# Patient Record
Sex: FEMALE | Race: OTHER | HISPANIC OR LATINO | URBAN - METROPOLITAN AREA
[De-identification: names, ages, dates, MRNs, and addresses within clinical notes are randomized per-mention and may not be internally consistent; named-entity substitution may affect disease eponyms.]

---

## 2023-08-16 ENCOUNTER — EMERGENCY (EMERGENCY)
Facility: HOSPITAL | Age: 53
LOS: 1 days | Discharge: HOME | End: 2023-08-16
Attending: EMERGENCY MEDICINE | Admitting: EMERGENCY MEDICINE
Payer: MEDICAID

## 2023-08-16 VITALS
TEMPERATURE: 98 F | OXYGEN SATURATION: 98 % | HEART RATE: 71 BPM | SYSTOLIC BLOOD PRESSURE: 177 MMHG | WEIGHT: 164.91 LBS | DIASTOLIC BLOOD PRESSURE: 91 MMHG | HEIGHT: 67 IN | RESPIRATION RATE: 18 BRPM

## 2023-08-16 DIAGNOSIS — F41.0 PANIC DISORDER [EPISODIC PAROXYSMAL ANXIETY]: ICD-10-CM

## 2023-08-16 DIAGNOSIS — R51.9 HEADACHE, UNSPECIFIED: ICD-10-CM

## 2023-08-16 DIAGNOSIS — I10 ESSENTIAL (PRIMARY) HYPERTENSION: ICD-10-CM

## 2023-08-16 DIAGNOSIS — T46.5X6A UNDERDOSING OF OTHER ANTIHYPERTENSIVE DRUGS, INITIAL ENCOUNTER: ICD-10-CM

## 2023-08-16 DIAGNOSIS — Z59.01 SHELTERED HOMELESSNESS: ICD-10-CM

## 2023-08-16 PROCEDURE — 99283 EMERGENCY DEPT VISIT LOW MDM: CPT

## 2023-08-16 PROCEDURE — 99284 EMERGENCY DEPT VISIT MOD MDM: CPT

## 2023-08-16 RX ORDER — ACETAMINOPHEN 500 MG
650 TABLET ORAL ONCE
Refills: 0 | Status: COMPLETED | OUTPATIENT
Start: 2023-08-16 | End: 2023-08-16

## 2023-08-16 RX ADMIN — Medication 650 MILLIGRAM(S): at 08:35

## 2023-08-16 SDOH — ECONOMIC STABILITY - HOUSING INSECURITY: SHELTERED HOMELESSNESS: Z59.01

## 2023-08-16 NOTE — ED PROVIDER NOTE - OBJECTIVE STATEMENT
The pt is a 52 y/o F, BIBA from shelter, for having a panic attack during fire drill - pt c/o feeling anxious, having palpitations and felt sob, all symptoms resolved w/i few min. Also states hx of HTN, but has not taken her meds- does not know what meds she's supposed to be taking.  Now c/o 2/10 ha. Denies cp, sob, n/v/d, abd pain, syncope, dizziness, or foot pain -- triage noted.

## 2023-08-16 NOTE — ED PROVIDER NOTE - NSFOLLOWUPINSTRUCTIONS_ED_ALL_ED_FT
hacer un seguimiento con la clínica para establecer la atención y obtener recetas para todos miroslava medicamentos regulares

## 2023-08-16 NOTE — ED ADULT TRIAGE NOTE - CHIEF COMPLAINT QUOTE
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

## 2023-08-16 NOTE — ED PROVIDER NOTE - CLINICAL SUMMARY MEDICAL DECISION MAKING FREE TEXT BOX
pt had panic attack during fire drill -- fully resolved w/i min, pt asymptomatic in ed, also states that is supposed to be on meds but does not know names (antihypertensives) - bp elevated but is asymptomatic, ekg non ischemic, clinic info given - pt understands to establish care and get her meds there, will not be starting any meds from ed, pt understands and agrees w/plan, strict return precautions given

## 2023-08-16 NOTE — ED PROVIDER NOTE - PATIENT PORTAL LINK FT
You can access the FollowMyHealth Patient Portal offered by [facility] by registering at the following website: [email or web address]. By joining [facility]’s FollowMyHealth portal, you will also be able to view your health information using other applications (apps) compatible with our system.

## 2023-08-16 NOTE — ED ADULT NURSE NOTE - NSFALLUNIVINTERV_ED_ALL_ED
Bed/Stretcher in lowest position, wheels locked, appropriate side rails in place/Call bell, personal items and telephone in reach/Instruct patient to call for assistance before getting out of bed/chair/stretcher/Non-slip footwear applied when patient is off stretcher/[place] to call system/Physically safe environment - no spills, clutter or unnecessary equipment/Purposeful proactive rounding/Room/bathroom lighting operational, light cord in reach

## 2023-08-16 NOTE — ED PROVIDER NOTE - CARE PLAN
Principal Discharge DX:	Panic attack  Secondary Diagnosis:	Elevated blood pressure reading with diagnosis of hypertension   1

## 2023-08-16 NOTE — ED PROVIDER NOTE - ATTENDING APP SHARED VISIT CONTRIBUTION OF CARE
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 Also states hx of HTN, but has not taken her meds- does not know what meds she's supposed to be taking.  Now c/o 2/10 ha. Denies cp, sob, n/v/d, abd pain, syncope, dizziness, or foot pain -- triage noted.    pt had panic attack during fire drill -- fully resolved w/i min, pt asymptomatic in ed, also states that is supposed to be on meds but does not know names (antihypertensives) - bp elevated but is asymptomatic, ekg non ischemic, clinic info given - pt understands to establish care and get her meds there, will not be starting any meds from ed, pt understands and agrees w/plan, strict return precautions given    Agree with above note as documented by PA.  I was available as the supervising attending during patient's ER evaluation.    Pt with palpitations now resolved stating she has felt similar in past from panic attack.  PE wnl - ekg nonischemic.  Pt feeling better in ED.  Unsure of htn meds but asymptomatic.  Received resources to establish PMD care.  Understands symptoms which warrant return to ED.

## 2023-08-16 NOTE — ED PROVIDER NOTE - NSFOLLOWUPCLINICS_GEN_ALL_ED_FT
[facility]  Family Medicine  [address]  [place], [place] [address]  Phone: [phone]  Fax:

## 2025-03-15 NOTE — ED PROVIDER NOTE - CPE EDP RESP NORM
normal...